# Patient Record
Sex: FEMALE | Race: WHITE | Employment: OTHER | URBAN - METROPOLITAN AREA
[De-identification: names, ages, dates, MRNs, and addresses within clinical notes are randomized per-mention and may not be internally consistent; named-entity substitution may affect disease eponyms.]

---

## 2018-01-24 ENCOUNTER — HOSPITAL ENCOUNTER (OUTPATIENT)
Dept: PHYSICAL THERAPY | Age: 63
Discharge: HOME | End: 2018-01-24
Payer: COMMERCIAL

## 2018-01-24 PROCEDURE — 97162 PT EVAL MOD COMPLEX 30 MIN: CPT | Performed by: PHYSICAL THERAPIST

## 2018-01-24 PROCEDURE — 97140 MANUAL THERAPY 1/> REGIONS: CPT | Performed by: PHYSICAL THERAPIST

## 2018-01-24 PROCEDURE — 97110 THERAPEUTIC EXERCISES: CPT | Performed by: PHYSICAL THERAPIST

## 2018-01-24 NOTE — PROGRESS NOTES
PT DAILY TREATMENT NOTE     Patient Name: Mariajose Montoya  Date:2018  : 1955  [x]  Patient  Verified  Payor: /    In time:336  Out time:426  Total Treatment Time (min): 50  Visit #: 1 of 8    Treatment Area: Low back pain [M54.5]  Sciatica [M54.30]    SUBJECTIVE  Pain Level (0-10 scale): 5/10- low back, 6/10 R LE  Any medication changes, allergies to medications, adverse drug reactions, diagnosis change, or new procedure performed?: [x] No    [] Yes (see summary sheet for update)  Subjective functional status/changes:   [] No changes reported  Pt is a 58year old female with subjective complaints of low back pain that started 4 weeks ago. She states that she was bending over mopping with she had a sharp intense cramp across her low back. She notes that she rested for 2 weeks thinking her pain would get better and it didn't so she went to see a specialist. She was states that she was put on a dose pack which significantly helped her low back pain. She notes that she took her last pill this morning. Currently she rates her low back pain a 5/10 and her R lateral thigh a 6/10. She states that her pain never goes past her R knee. Functional limitations include bending over, going up the stairs, sleeping in bed laying flat, rolling over, and getting up out of bed. Her symptoms were eased with the prescription medication, sleeping in recliner, and heating pad. She had an Xray last week which showed arthritis. She has a past medical history of low back pain. Pt is retired. Negative for red flags. FOTO: 56/100.      OBJECTIVE    25 min [x]Eval                  []Re-Eval       15 min Therapeutic Exercise:  [x] See flow sheet : RORY, established/educated HEP   Rationale: increase ROM to improve the patients ability to improve centralization of the R LE symptoms to the low back to maximize activity tolerance    10 min Manual Therapy: MET to correct R anterior/L posterior rotation with Pt educated Rationale:      decrease pain, increase ROM, increase tissue extensibility and decrease trigger points to improve patient's ability to improve gait stability and decrease stress on the lumbar spine         With   [] TE   [] TA   [] neuro   [] other: Patient Education: [x] Review HEP    [] Progressed/Changed HEP based on:   [] positioning   [] body mechanics   [] transfers   [] heat/ice application    [] Graston Education: Explained the effects and benefits of Graston Technique therapy including potential for post treatment soreness and bruising. [] Other:      Other Objective/Functional Measures:   Upon evaluation, pt presents with normalized gait pattern. AROM of the lumbar spine is WNL in all planes. Repeated movements testing was + for RORY as her symptoms centralized to the Lumbar spine. She presents with tenderness over the R lumbar extensors, glut max, piriformis, and QL. Noted R anterior/L posterior rotation of the pelvis corrected with MET. + SLR on the R. HS flexibility WNLs B. Negative hip screen B. B hip strength is a 4/5 grossly in all planes. Pain Level (0-10 scale) post treatment: 2/10    ASSESSMENT/Changes in Function:   [x]  See Plan of Care  []  See progress note/recertification  []  See Discharge Summary         Progress towards goals / Updated goals:  PER POC    PLAN  [x]  Upgrade activities as tolerated     [x]  Continue plan of care  []  Update interventions per flow sheet       []  Discharge due to:_  [x]  Other:2x/week for 4 weeks    Justification for Eval Code Complexity:  Patient History : chronic low back pain, overweight  Examination see exam   Clinical Presentation: evolving  Clinical Decision Making : FOTO : 64 /100        Justa Garcia DPT 1/24/2018  3:43 PM    No future appointments.

## 2018-01-24 NOTE — PROGRESS NOTES
[address] [facility]  [address] [place] [address],[address], [place]  Phone [phone]  Fax [phone] / [address] [address] PHYSICAL THERAPY SERVICES  Patient Name: Leeann Polk : 1955   Medical   Diagnosis: Low back pain [M54.5]  Sciatica [M54.30] Treatment Diagnosis: Low back pain   Onset Date: 4 weeks prior to evaluation     Referral Source: ALICJA Crouch Start of [facility]): 2018   Prior Hospitalization: See medical history Provider #: [id number]   Prior Level of Function: Functional IND, retired   Comorbidities: Diabetes, arthritis   Medications: Verified on Patient Summary List   The Plan of Care and following information is based on the information from the initial evaluation.   =================================================================================  Assessment / key information:  Pt is a 58year old female with subjective complaints of low back pain that started 4 weeks ago. She states that she was bending over mopping with she had a sharp intense cramp across her low back. She notes that she rested for 2 weeks thinking her pain would get better and it didn't so she went to see a specialist. She was states that she was put on a dose pack which significantly helped her low back pain. She notes that she took her last pill this morning. Currently she rates her low back pain a 5/10 and her R lateral thigh a 6/10. She states that her pain never goes past her R knee. Functional limitations include bending over, going up the stairs, sleeping in bed laying flat, rolling over, and getting up out of bed. Her symptoms were eased with the prescription medication, sleeping in recliner, and heating pad. She had an Xray last week which showed arthritis. She has a past medical history of low back pain. Pt is retired. Negative for red flags. FOTO: 56/100.  Upon evaluation, pt presents with normalized gait pattern. AROM of the lumbar spine is WNL in all planes. Repeated movements testing was + for RORY as her symptoms centralized to the Lumbar spine. She presents with tenderness over the R lumbar extensors, glut max, piriformis, and QL. Noted R anterior/L posterior rotation of the pelvis corrected with MET. + SLR on the R. HS flexibility WNLs B. Negative hip screen B. B hip strength is a 4/5 grossly in all planes. Pts signs and symptoms are consistent with disc involvement at L3-4 as well as pelvic instability. She would benefit from a course of skilled PT to address above deficits to return to PLOF symptom free.                      =================================================================================  Eval Complexity: History: MEDIUM  Complexity : 1-2 comorbidities / personal factors will impact the outcome/ POC Exam:HIGH Complexity : 4+ Standardized tests and measures addressing body structure, function, activity limitation and / or participation in recreation  Presentation: MEDIUM Complexity : Evolving with changing characteristics  Clinical Decision Making:MEDIUM Complexity : FOTO score of 26-74Overall Complexity:MEDIUM  Problem List: pain affecting function, decrease strength, decrease ADL/ functional abilitiies, decrease activity tolerance, decrease flexibility/ joint mobility and decrease transfer abilities   Treatment Plan may include any combination of the following: Therapeutic exercise, Therapeutic activities, Neuromuscular re-education, Physical agent/modality, Gait/balance training, Manual therapy and Patient education  Patient / Family readiness to learn indicated by: asking questions and trying to perform skills  Persons(s) to be included in education: patient (P)  Barriers to Learning/Limitations: None  Measures taken:    Patient Goal (s): To feel better without pain   Patient self reported health status: good  Rehabilitation Potential: good   Short Term Goals:  To be accomplished in  1 weeks: 1. Pt will be IND and compliant with HEP for self-management of symptoms.  Long Term Goals: To be accomplished in  4  weeks:  1. Pt will improve core/lumbar strength to 5/5 to improve postural stability. 2. Pt will present to clinic for 3 consecutive sessions with level pelvis as an indicator of improved stability. 3. Pt will be able to demo proper lifting mechanics from all levels to decrease risk of re-occurrence of symptoms. 4. Pt will improve FOTO score to at least 68/100 as a functional indicator of improved mobility. Frequency / Duration:   Patient to be seen  2  times per week for 4  weeks:  Patient / Caregiver education and instruction: exercises  G-Codes (GP): NAYA  Therapist Signature: Shanta Sepulveda DPT Date: 0/49/5074   Certification Period: NA Time: 4:59 PM   ===========================================================================================  I certify that the above Physical Therapy Services are being furnished while the patient is under my care. I agree with the treatment plan and certify that this therapy is necessary. Physician Signature:        Date:       Time:     Please sign and return to [facility] or you may fax the signed copy to [phone]. Thank you.

## 2018-01-25 ENCOUNTER — HOSPITAL ENCOUNTER (OUTPATIENT)
Dept: PHYSICAL THERAPY | Age: 63
Discharge: HOME | End: 2018-01-25
Payer: COMMERCIAL

## 2018-01-25 PROCEDURE — 97110 THERAPEUTIC EXERCISES: CPT | Performed by: PHYSICAL THERAPIST

## 2018-01-25 PROCEDURE — 97014 ELECTRIC STIMULATION THERAPY: CPT | Performed by: PHYSICAL THERAPIST

## 2018-01-25 NOTE — PROGRESS NOTES
PT DAILY TREATMENT NOTE     Patient Name: Seth Velasquez  Date:2018  : 1955  [x]  Patient  Verified  Payor: BLUE CROSS / Plan: [facility] PPO / Product Type: PPO /    In time:235  Out time:330  Total Treatment Time (min): 54  Visit #: 2 of 8    Treatment Area: Low back pain [M54.5]  Sciatica [M54.30]    SUBJECTIVE  Pain Level (0-10 scale): 2/10  Any medication changes, allergies to medications, adverse drug reactions, diagnosis change, or new procedure performed?: [x] No    [] Yes (see summary sheet for update)  Subjective functional status/changes:   [] No changes reported  Pt reports that she did her HEP and it felt good while she was doing it but when she stopped she had the pain.      OBJECTIVE    Modality rationale: decrease pain and increase tissue extensibility to improve the patients ability to improve standing tolerance   Min Type Additional Details   15 [x] Estim:  [x]Unatt       [x]IFC  []Premod                        []Other:  []w/ice   [x]w/heat  Position: prone with REIL every 30 sec for 5 sec  Location:low back    [] Estim: []Att    []TENS instruct  []NMES                    []Other:  []w/US   []w/ice   []w/heat  Position:  Location:    []  Traction: [] Cervical       []Lumbar                       [] Prone          []Supine                       []Intermittent   []Continuous Lbs:  [] before manual  [] after manual    []  Ultrasound: []Continuous   [] Pulsed                           []1MHz   []3MHz W/cm2:  Location:    []  Iontophoresis with dexamethasone         Location: [] Take home patch   [] In clinic    []  Ice     []  heat  []  Ice massage  []  Laser   []  Anodyne Position:  Location:    []  Laser with stim  []  Other:  Position:  Location:    []  Vasopneumatic Device Pressure:       [] lo [] med [] hi   Temperature: [] lo [] med [] hi   [] Skin assessment post-treatment:  []intact []redness- no adverse reaction    []redness - adverse reaction:       40 min Therapeutic Exercise:  [] See flow sheet : initiated PT POC   Rationale: increase ROM and increase strength to improve the patients ability to improve postural stability       With   [] TE   [] TA   [] neuro   [] other: Patient Education: [x] Review HEP    [] Progressed/Changed HEP based on:   [] positioning   [] body mechanics   [] transfers   [] heat/ice application    [] Graston Education: Explained the effects and benefits of Graston Technique therapy including potential for post treatment soreness and bruising. [] Other:      Other Objective/Functional Measures:   REIL with IFC abolished low back tightness  Increased discomfort in the low back in semi-reclined position to perform supine therex  SI level this session     Pain Level (0-10 scale) post treatment: 0/10    ASSESSMENT/Changes in Function:   Pt tolerated initiation of care with decreased low back pain and R LE. She continue to have difficulty with bed mobility reproducing her low back/ RLE pain. Continue to progress as tolerated. Patient will continue to benefit from skilled PT services to modify and progress therapeutic interventions, address functional mobility deficits, address ROM deficits, address strength deficits, analyze and address soft tissue restrictions and assess and modify postural abnormalities to attain remaining goals.      []  See Plan of Care  []  See progress note/recertification  []  See Discharge Summary         Progress towards goals / Updated goals:  1st FU visit, initiated PT POC    PLAN  [x]  Upgrade activities as tolerated     [x]  Continue plan of care  []  Update interventions per flow sheet       []  Discharge due to:_  []  Other:_      Chiara Champagne DPT 1/25/2018  3:49 PM    Future Appointments  Date Time Provider Helder Ziegler   1/29/2018 8:00 AM Courtney Nuno PTA [facility]   1/31/2018 11:00 AM Chiara Champagne DPT [facility]   2/5/2018 2:30 PM Chiara Champagne DPT [facility]   2/7/2018 12:00 PM Pricilla Law DPT [facility]   2/12/2018 9:30 AM Anna Hays PTA [facility]   2/14/2018 11:00 AM Pricilla Law DPT [facility]   2/19/2018 9:30 AM Anna Hays PTA [facility]   2/21/2018 9:30 AM Pricilla Law DPT [facility]

## 2018-01-29 ENCOUNTER — HOSPITAL ENCOUNTER (OUTPATIENT)
Dept: PHYSICAL THERAPY | Age: 63
Discharge: HOME | End: 2018-01-29
Payer: COMMERCIAL

## 2018-01-29 PROCEDURE — 97014 ELECTRIC STIMULATION THERAPY: CPT

## 2018-01-29 PROCEDURE — 97110 THERAPEUTIC EXERCISES: CPT

## 2018-01-29 NOTE — PROGRESS NOTES
PT DAILY TREATMENT NOTE 12    Patient Name: Nieves Escudero  Date:2018  : 1955  [x]  Patient  Verified  Payor: BLUE CROSS / Plan: [facility] PPO / Product Type: PPO /    In time: 8:00Out time:9:00  Total Treatment Time (min): 60  Visit #: 3 of 8    Treatment Area: Low back pain [M54.5]  Sciatica [M54.30]    SUBJECTIVE  Pain Level (0-10 scale): 2/10  Any medication changes, allergies to medications, adverse drug reactions, diagnosis change, or new procedure performed?: [x] No    [] Yes (see summary sheet for update)  Subjective functional status/changes:   [] No changes reported  I am doing much better     OBJECTIVE    Modality rationale: decrease pain and increase tissue extensibility to improve the patients ability to improve standing tolerance   Min Type Additional Details   15 [x] Estim:  [x]Unatt       [x]IFC  []Premod                        []Other:  []w/ice   [x]w/heat  Position: prone with REIL every 30 sec for 5 sec  Location:low back    [] Estim: []Att    []TENS instruct  []NMES                    []Other:  []w/US   []w/ice   []w/heat  Position:  Location:    []  Traction: [] Cervical       []Lumbar                       [] Prone          []Supine                       []Intermittent   []Continuous Lbs:  [] before manual  [] after manual    []  Ultrasound: []Continuous   [] Pulsed                           []1MHz   []3MHz W/cm2:  Location:    []  Iontophoresis with dexamethasone         Location: [] Take home patch   [] In clinic    []  Ice     []  heat  []  Ice massage  []  Laser   []  Anodyne Position:  Location:    []  Laser with stim  []  Other:  Position:  Location:    []  Vasopneumatic Device Pressure:       [] lo [] med [] hi   Temperature: [] lo [] med [] hi   [] Skin assessment post-treatment:  []intact []redness- no adverse reaction    []redness - adverse reaction:       35/30 min Therapeutic Exercise:  [x] See flow sheet : 5 min NC for warm up    Rationale: increase ROM and increase strength to improve the patients ability to improve postural stability    5 min Manual Therapy:  (R) anterior rotated and (L) posterior rotated   Rationale: decrease pain, increase ROM, decrease trigger points and increase postural awareness to SI and lumbar area       5 With   [x] TE   [] TA   [] neuro   [] other: Patient Education: [x] Review HEP    [] Progressed/Changed HEP based on:   [] positioning   [] body mechanics   [] transfers   [] heat/ice application    [] Graston Education: Explained the effects and benefits of Graston Technique therapy including potential for post treatment soreness and bruising. [x] Other: gave handout with self correction of SI     Other Objective/Functional Measures: patient presented with a slight SI dysfunction - instructed in self correction of the SI for home use       Continue with decreasing pain and increasing core stability and achieve goals stated below       Pain Level (0-10 scale) post treatment: 0/10    ASSESSMENT/Changes in Function:   Patient will continue to benefit from skilled PT services to modify and progress therapeutic interventions, address functional mobility deficits, address ROM deficits, address strength deficits, analyze and address soft tissue restrictions and assess and modify postural abnormalities to attain remaining goals. [x]  See Plan of Care  []  See progress note/recertification  []  See Discharge Summary         Progress towards goals / Updated goals: · Short Term Goals: To be accomplished in  1  weeks:  1. Pt will be IND and compliant with HEP for self-management of symptoms. · Long Term Goals: To be accomplished in  4  weeks:  1. Pt will improve core/lumbar strength to 5/5 to improve postural stability. 2. Pt will present to clinic for 3 consecutive sessions with level pelvis as an indicator of improved stability.   3. Pt will be able to demo proper lifting mechanics from all levels to decrease risk of re-occurrence of symptoms. 4. Pt will improve FOTO score to at least 68/100 as a functional indicator of improved mobility.      PLAN  [x]  Upgrade activities as tolerated     [x]  Continue plan of care  []  Update interventions per flow sheet       []  Discharge due to:_  []  Other:_      Kranthi Abdi PTA 1/29/2018  3:49 PM    Future Appointments  Date Time Provider Helder Ziegler   1/31/2018 11:00 AM Lorin Amaro DPT [facility]   2/5/2018 2:30 PM Lorin Amaro DPT [facility]   2/7/2018 12:00 PM Lorin Amaro DPT [facility]   2/12/2018 9:30 AM Kranthi Abdi PTA [facility]   2/14/2018 11:00 AM Lorin Amaro DPT [facility]   2/19/2018 9:30 AM Kranthi Abdi PTA [facility]   2/21/2018 9:30 AM Lorin Amaro DPT [facility]

## 2018-01-31 ENCOUNTER — APPOINTMENT (OUTPATIENT)
Dept: PHYSICAL THERAPY | Age: 63
End: 2018-01-31
Payer: COMMERCIAL

## 2018-02-05 ENCOUNTER — APPOINTMENT (OUTPATIENT)
Dept: PHYSICAL THERAPY | Age: 63
End: 2018-02-05

## 2018-02-07 ENCOUNTER — APPOINTMENT (OUTPATIENT)
Dept: PHYSICAL THERAPY | Age: 63
End: 2018-02-07

## 2018-02-12 ENCOUNTER — APPOINTMENT (OUTPATIENT)
Dept: PHYSICAL THERAPY | Age: 63
End: 2018-02-12

## 2018-02-14 ENCOUNTER — APPOINTMENT (OUTPATIENT)
Dept: PHYSICAL THERAPY | Age: 63
End: 2018-02-14

## 2018-02-19 ENCOUNTER — APPOINTMENT (OUTPATIENT)
Dept: PHYSICAL THERAPY | Age: 63
End: 2018-02-19

## 2018-02-21 ENCOUNTER — APPOINTMENT (OUTPATIENT)
Dept: PHYSICAL THERAPY | Age: 63
End: 2018-02-21

## 2018-03-21 NOTE — PROGRESS NOTES
[address] [facility] AT [facility]  [address] [place] [address],[address], [place], [place]  Phone [phone]  Fax  SUMMARY  Patient Name: Michelle Le : 1955   Treatment/Medical Diagnosis: Low back pain [M54.5]  Sciatica [M54.30]   Referral Source: Maisha Ghosh, [address] [place], [place] [address]stef[place]     Date of Initial Visit: 2018 Attended Visits: 3 Missed Visits: 5       Summary of Care: Thank you for referring this pleasant patient. Zoltan Montague has completed 3 of 8 proposed sessions   After 3rd PT session patient did not return to therapy - reason - unknown  If patient attempts to contact the office: Will advise patient that is any additional follow up or recommendation is needed to return to referring physician. Reason for Discharge:  [x] The patient was non-compliant with attendance. [] The patient could not be contacted to schedule further therapy sessions. [] The patient has been discharged based on the orders of the referring physician.  [] The patient has requested to be discharged due to:   [] Patient has met all goals or max benefit has been achieved      If you have any questions/comments please contact us directly at [phone]. Thank you for allowing us to assist in the care of your patient.     Therapist Signature: Mariam Strong PTA Date: 3/21/2018     Time: 7:15 AM

## 2021-02-20 PROBLEM — H25.012 CORTICAL AGE-RELATED CATARACT OF LEFT EYE: Status: ACTIVE | Noted: 2021-02-20

## 2021-02-22 PROBLEM — H25.012 CORTICAL AGE-RELATED CATARACT OF LEFT EYE: Status: RESOLVED | Noted: 2021-02-20 | Resolved: 2021-02-22

## 2021-03-13 PROBLEM — H25.011 CORTICAL AGE-RELATED CATARACT OF RIGHT EYE: Status: ACTIVE | Noted: 2021-03-13

## 2021-03-15 PROBLEM — H25.011 CORTICAL AGE-RELATED CATARACT OF RIGHT EYE: Status: RESOLVED | Noted: 2021-03-13 | Resolved: 2021-03-15
